# Patient Record
Sex: FEMALE | Race: WHITE
[De-identification: names, ages, dates, MRNs, and addresses within clinical notes are randomized per-mention and may not be internally consistent; named-entity substitution may affect disease eponyms.]

---

## 2020-09-14 ENCOUNTER — HOSPITAL ENCOUNTER (OUTPATIENT)
Dept: HOSPITAL 92 - LABBT | Age: 20
Discharge: HOME | End: 2020-09-14
Attending: OTOLARYNGOLOGY
Payer: COMMERCIAL

## 2020-09-14 DIAGNOSIS — Z20.828: ICD-10-CM

## 2020-09-14 DIAGNOSIS — J34.2: ICD-10-CM

## 2020-09-14 DIAGNOSIS — J34.89: ICD-10-CM

## 2020-09-14 DIAGNOSIS — J34.3: ICD-10-CM

## 2020-09-14 DIAGNOSIS — S09.92XA: Primary | ICD-10-CM

## 2020-09-14 LAB
PREGS CONTROL BACKGROUND?: (no result)
PREGS CONTROL BAR APPEAR?: YES

## 2020-09-14 PROCEDURE — U0003 INFECTIOUS AGENT DETECTION BY NUCLEIC ACID (DNA OR RNA); SEVERE ACUTE RESPIRATORY SYNDROME CORONAVIRUS 2 (SARS-COV-2) (CORONAVIRUS DISEASE [COVID-19]), AMPLIFIED PROBE TECHNIQUE, MAKING USE OF HIGH THROUGHPUT TECHNOLOGIES AS DESCRIBED BY CMS-2020-01-R: HCPCS

## 2020-09-14 PROCEDURE — 87635 SARS-COV-2 COVID-19 AMP PRB: CPT

## 2020-09-14 PROCEDURE — 84703 CHORIONIC GONADOTROPIN ASSAY: CPT

## 2020-09-14 PROCEDURE — 85014 HEMATOCRIT: CPT

## 2020-09-17 ENCOUNTER — HOSPITAL ENCOUNTER (OUTPATIENT)
Dept: HOSPITAL 92 - SDC | Age: 20
Discharge: HOME | End: 2020-09-17
Attending: OTOLARYNGOLOGY
Payer: COMMERCIAL

## 2020-09-17 VITALS — BODY MASS INDEX: 21.5 KG/M2

## 2020-09-17 DIAGNOSIS — J34.89: ICD-10-CM

## 2020-09-17 DIAGNOSIS — Z53.09: ICD-10-CM

## 2020-09-17 DIAGNOSIS — D68.0: ICD-10-CM

## 2020-09-17 DIAGNOSIS — S09.92XA: Primary | ICD-10-CM

## 2020-09-17 DIAGNOSIS — J34.3: ICD-10-CM

## 2020-09-17 DIAGNOSIS — Z79.899: ICD-10-CM

## 2020-09-17 DIAGNOSIS — J45.909: ICD-10-CM

## 2020-09-17 DIAGNOSIS — J34.2: ICD-10-CM

## 2020-12-07 ENCOUNTER — HOSPITAL ENCOUNTER (OUTPATIENT)
Dept: HOSPITAL 92 - LABBT | Age: 20
Discharge: HOME | End: 2020-12-07
Attending: OTOLARYNGOLOGY
Payer: COMMERCIAL

## 2020-12-07 DIAGNOSIS — Z20.828: ICD-10-CM

## 2020-12-07 DIAGNOSIS — S09.92XA: ICD-10-CM

## 2020-12-07 DIAGNOSIS — Z01.812: Primary | ICD-10-CM

## 2020-12-07 DIAGNOSIS — J34.2: ICD-10-CM

## 2020-12-07 DIAGNOSIS — J34.89: ICD-10-CM

## 2020-12-07 DIAGNOSIS — J34.3: ICD-10-CM

## 2020-12-07 LAB
PREGS CONTROL BACKGROUND?: (no result)
PREGS CONTROL BAR APPEAR?: YES

## 2020-12-07 PROCEDURE — 84703 CHORIONIC GONADOTROPIN ASSAY: CPT

## 2020-12-07 PROCEDURE — 87635 SARS-COV-2 COVID-19 AMP PRB: CPT

## 2020-12-07 PROCEDURE — 85014 HEMATOCRIT: CPT

## 2020-12-07 PROCEDURE — U0003 INFECTIOUS AGENT DETECTION BY NUCLEIC ACID (DNA OR RNA); SEVERE ACUTE RESPIRATORY SYNDROME CORONAVIRUS 2 (SARS-COV-2) (CORONAVIRUS DISEASE [COVID-19]), AMPLIFIED PROBE TECHNIQUE, MAKING USE OF HIGH THROUGHPUT TECHNOLOGIES AS DESCRIBED BY CMS-2020-01-R: HCPCS

## 2020-12-10 ENCOUNTER — HOSPITAL ENCOUNTER (OUTPATIENT)
Dept: HOSPITAL 92 - SDC | Age: 20
Discharge: HOME | End: 2020-12-10
Attending: OTOLARYNGOLOGY
Payer: COMMERCIAL

## 2020-12-10 VITALS — BODY MASS INDEX: 21.4 KG/M2
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
## 2020-12-10 DIAGNOSIS — G44.89: ICD-10-CM

## 2020-12-10 DIAGNOSIS — S09.92XA: ICD-10-CM

## 2020-12-10 DIAGNOSIS — J34.3: ICD-10-CM

## 2020-12-10 DIAGNOSIS — D68.0: ICD-10-CM

## 2020-12-10 DIAGNOSIS — J45.909: ICD-10-CM

## 2020-12-10 DIAGNOSIS — Z79.899: ICD-10-CM

## 2020-12-10 DIAGNOSIS — J34.2: Primary | ICD-10-CM

## 2020-12-10 DIAGNOSIS — J34.89: ICD-10-CM

## 2020-12-10 PROCEDURE — 09TL8ZZ RESECTION OF NASAL TURBINATE, VIA NATURAL OR ARTIFICIAL OPENING ENDOSCOPIC: ICD-10-PCS | Performed by: OTOLARYNGOLOGY

## 2020-12-10 PROCEDURE — 09SM0ZZ REPOSITION NASAL SEPTUM, OPEN APPROACH: ICD-10-PCS | Performed by: OTOLARYNGOLOGY

## 2020-12-11 NOTE — OP
DATE OF PROCEDURE:  12/10/2020



PREOPERATIVE DIAGNOSES:  Profound septal deformity, rhinogenic headache, and

obstructive hypertrophic inferior turbinates. 



POSTOPERATIVE DIAGNOSES:  Profound septal deformity, rhinogenic headache, and

obstructive hypertrophic inferior turbinates. 



PROCEDURE PERFORMED:  Septoplasty and bilateral nasal endoscopy with submucosal

resection of inferior turbinates. 



DESCRIPTION OF PROCEDURE:  Septoplasty:  After local anesthesia was infiltrated into

the submucoperichondrial plane, a standard Samm incision was made with a #15

blade down to the level of the septal cartilage.  The caudal elevator was used to

elevate the mucoperichondrium from the underlying cartilage.  We then proceeded

beyond the bony cartilaginous junction and elevated the bony periosteum as well.

Great attention was paid to the spur to prevent rent formation in the septal flap. A

transcartilaginous incision was then made, while preserving an adequate dorsal and

caudal cartilaginous strut for tip support.  The deformed cartilage was removed and

disarticulated from the bony cartilaginous junction and maxillary crest.  This was

placed in saline and would later be crushed and returned to the mucoperichondrial

envelope.  We then elevated the contralateral periosteum from the bony cartilaginous

region and removed the deformed portions of the bone and bony spurs.  The cartilage

was then crushed and placed back into the mucoperichondrial envelope and the mucosa

was re-approximated with a quilting stitch composed of rapidly absorbent gut suture.

 The [place] incision was also closed with interrupted gut suture.  At the

completion of the case, Shukla splints were placed and suture secured to the caudal

septum. 



Bilateral nasal endoscopy with submucosal resection of inferior turbinates:  After

consent was obtained, the patient was identified, brought to the operating room, and

placed on the operating room table in the supine position.  Consent was obtained,

notifying the patient of the possibility of additional infections, bleeding, brain

injury, and eye/orbital injury.   The patient was placed on the operating room

table, and general endotracheal anesthesia and intravenous access was obtained.  The

patient was then positioned, prepped and draped for endoscopic sinus surgery.  Nasal

preparation included trimming nasal vestibular hairs and spraying in topical Afrin.

We then placed Afrin topical solution on nasal pledgets and strategically located

them intranasally.  The perinasal mucosa was injected with 1% lidocaine with

1:100,000 epinephrine in the submucoperichondrial plane of the septum, lateral nasal

wall, and anterior to the uncinate.  The patient was then prepped and draped in a

sterile fashion and positioned for endoscopic sinus surgery. 



With the 0-degree endoscope, the patient underwent systematic nasal endoscopy.

There were no suspicious internasal masses or lesions identified.  We then focused

our attention to the osteomeatal complex region under the middle turbinate. 



The inferior turbinates were visualized with a 0 degree endoscope and outfractured

with a [place] elevator.  The inferior medial aspect was cauterized with the

electrocautery.  Hemostasis was obtained .  After adequate airway was established,

we turned our attention to the contralateral side and used a similar procedure.

Again, a Joel elevator was used to outfracture inferior turbinates under endoscopic

visualization.  With a suction cautery, the free inferior medial aspect was

cauterized under direct visualization along the length of the inferior turbinate. 



At this point, we then turned our attention to the contralateral side and proceeded

with endoscopic sinus surgery. 



At the completion of the case, [place] keel splints were placed in the ethmoid cavities

after the ethmoidectomy.  There were no complications.  The patient tolerated the

procedure well and was discharged to the recovery room in stable condition prior to

return to the preoperative day stay with ultimate discharge home.  Prescriptions for

pain medication and antibiotics were provided.  The patient received intramuscular

Depo-Medrol during the case. 







Job ID:  [id number]

## 2020-12-16 ENCOUNTER — HOSPITAL ENCOUNTER (OUTPATIENT)
Dept: HOSPITAL 92 - SDC | Age: 20
Discharge: HOME | End: 2020-12-16
Attending: OTOLARYNGOLOGY
Payer: COMMERCIAL

## 2020-12-16 DIAGNOSIS — J45.909: ICD-10-CM

## 2020-12-16 DIAGNOSIS — D64.9: ICD-10-CM

## 2020-12-16 DIAGNOSIS — R04.0: Primary | ICD-10-CM

## 2020-12-16 DIAGNOSIS — D68.0: ICD-10-CM

## 2020-12-16 PROCEDURE — 093K8ZZ CONTROL BLEEDING IN NASAL MUCOSA AND SOFT TISSUE, VIA NATURAL OR ARTIFICIAL OPENING ENDOSCOPIC: ICD-10-PCS | Performed by: OTOLARYNGOLOGY

## 2020-12-16 NOTE — OP
DATE OF PROCEDURE:  12/16/2020



PREOPERATIVE DIAGNOSES:  

1. Coagulopathic epistaxis.

2. Von Willebrand disease.



POSTOPERATIVE DIAGNOSES:  

1. Coagulopathic epistaxis.

2. Von Willebrand disease.



PROCEDURES PERFORMED:  

1. Evaluation under anesthesia.

2. Endoscopic control of nasal hemorrhage.



PROCEDURE IN DETAIL:  After consent was obtained, the patient was identified,

brought to the operating room, placed on operating room table, the patient was

intubated in rapid sequence fashion.  We then turned our attention to the nose and

removed extensive clots and previously placed packing from her nose. Clot from her

oropharynx was also removed.  We then examined the nose endoscopically and found 3

bleeding points, one from the inferior turbinate, one from the middle turbinate, and

one from the mid septal region.  The spots were cauterized and then treated with a

procoagulant and that was followed with packing with both Gelfoam as well as

Avitene.  A Shukla splint was placed in the contralateral side to keep the septum in

midline and after all hemostasis was obtained, the patient was awakened, extubated,

and taken to recovery room in stable condition prior to discharge home. 







Job ID:  [id number]